# Patient Record
Sex: FEMALE | Race: WHITE
[De-identification: names, ages, dates, MRNs, and addresses within clinical notes are randomized per-mention and may not be internally consistent; named-entity substitution may affect disease eponyms.]

---

## 2018-12-31 ENCOUNTER — HOSPITAL ENCOUNTER (EMERGENCY)
Dept: HOSPITAL 25 - UCEAST | Age: 63
Discharge: HOME | End: 2018-12-31
Payer: COMMERCIAL

## 2018-12-31 VITALS — SYSTOLIC BLOOD PRESSURE: 141 MMHG | DIASTOLIC BLOOD PRESSURE: 94 MMHG

## 2018-12-31 DIAGNOSIS — Z88.5: ICD-10-CM

## 2018-12-31 DIAGNOSIS — Z91.048: ICD-10-CM

## 2018-12-31 DIAGNOSIS — R23.8: Primary | ICD-10-CM

## 2018-12-31 DIAGNOSIS — J45.909: ICD-10-CM

## 2018-12-31 DIAGNOSIS — Z88.8: ICD-10-CM

## 2018-12-31 DIAGNOSIS — Z87.891: ICD-10-CM

## 2018-12-31 DIAGNOSIS — I10: ICD-10-CM

## 2018-12-31 PROCEDURE — 99213 OFFICE O/P EST LOW 20 MIN: CPT

## 2018-12-31 PROCEDURE — G0463 HOSPITAL OUTPT CLINIC VISIT: HCPCS

## 2018-12-31 NOTE — UC
Ear Complaint HPI





- HPI Summary


HPI Summary: 


Pt felt R ear discomfort, occasional pain and she is wondering if there is a 

bug in her ear.  This started 4-5 days ago.








- History of Current Complaint


Chief Complaint: UCEar


Stated Complaint: EAR PAIN


Time Seen by Provider: 12/31/18 13:12


Pregnant?: No


Pain Intensity: 6


Pain Scale Used: 0-10 Numeric


Aggravating Factors: Nothing


Alleviating Factors: Other (Noted In Comments) - hydrogen peroxide.


Associated Signs/Symptoms: Negative: Discharge, Hearing Loss





- Allergies/Home Medications


Allergies/Adverse Reactions: 


 Allergies











Allergy/AdvReac Type Severity Reaction Status Date / Time


 


codeine Allergy  Unknown Verified 12/31/18 13:14





   Reaction  





   Details  


 


propoxyphene [From Darvon] Allergy  Dizziness Verified 12/31/18 13:14


 


horse serum Allergy  Unknown Uncoded 12/31/18 13:14





   Reaction  





   Details  











Home Medications: 


 Home Medications





Fluticasone NASAL SPRAY 50MCG* [Flonase NASAL SPRAY 50MCG*] 1 spray INH ONCE 

PRN 12/31/18 [History Confirmed 12/31/18]


Loratadine [Claritin] 10 mg PO ONCE PRN 12/31/18 [History Confirmed 12/31/18]











PMH/Surg Hx/FS Hx/Imm Hx


Previously Healthy: Yes


Cardiovascular History: Hypertension


Respiratory History: Asthma





- Surgical History


Surgical History: Yes


Surgery Procedure, Year, and Place: hip replacements





- Family History


Known Family History: Positive: Other - Alzheimer's





- Social History


Alcohol Use: Occasionally


Substance Use Type: Prescribed


Smoking Status (MU): Former Smoker


Type: Cigarettes


Amount Used/How Often: 1PCK/DAY


Length of Time of Smoking/Using Tobacco: 10 YEARS


Have You Smoked in the Last Year: No





Review of Systems


All Other Systems Reviewed And Are Negative: Yes


Constitutional: Positive: Negative


Skin: Positive: Negative


Respiratory: Positive: Negative


Cardiovascular: Positive: Negative





Physical Exam


Vital Signs: 


 Initial Vital Signs











Temp  95.9 F   12/31/18 13:09


 


Pulse  90   12/31/18 13:09


 


Resp  18   12/31/18 13:09


 


BP  141/94   12/31/18 13:09


 


Pulse Ox  97   12/31/18 13:09














Ear Complaint Course/Dx





- Course


Course Of Treatment: Started w/ R ear pain and a small pimple/papule was noted 

in R canal.  We also flushed it to clear cerumen debris.  NO TM involvement.





- Differential Dx/Diagnosis


Differential Diagnosis/HQI/PQRI: Otitis Externa, Otitis Media, Trauma, Other


Provider Diagnosis: 


 Papule of skin








Discharge





- Sign-Out/Discharge


Documenting (check all that apply): Patient Departure


All imaging exams completed and their final reports reviewed: No Studies





- Discharge Plan


Condition: Good


Disposition: HOME


Patient Education Materials:  Earache (ED)


Referrals: 


Smita Alanis MD [Primary Care Provider] - 


Additional Instructions: 


The papule in your ear should resolve on its own.  It's best not to put 

anything inside the ear.





- Billing Disposition and Condition


Condition: GOOD


Disposition: Home

## 2018-12-31 NOTE — XMS REPORT
Continuity of Care Document (CCD)

 Created on:2018



Patient:Smita Weber

Sex:Female

:1955

External Reference #:[id number]





Demographics







 Address  [address] [place] [address]

 

 Home Phone  [phone]

 

 Preferred Language  en

 

 Marital Status  Not  or 

 

 Orthodoxy Affiliation  Unknown

 

 Race  White

 

 Ethnic Group  Not  or 









Author







 Name  Ronit Murray PA-C

 

 Address  [address] [place] [address] Road



   Unavailable



   [place] [address]









Care Team Providers







 Name  Role  Phone

 

 Smita Alanis MD  Care Team Information   Unavailable

 

 Smita Alanis MD  Primary Care Physician  Unavailable









Payers







 Type  Date  Identification Numbers  Payment Provider  Subscriber

 

     Policy Number: [id number]  [facility]  Smita Weber









 Group Number: [id number]  [address] [address]

 

 PayID: [id number]  [place] [address]







Advance Directives







 Description

 

 No Information Available







Problems







 Date  Description  Provider  Status

 

 Onset: 2018  Nausea and vomiting  Ronit Murray PA-C  Active

 

 Onset: 2018  Esophageal dysphagia  Ronit Murray PA-C  Active

 

 Onset: 2018  Gastroesophageal reflux disease  Ronit Murray PA-C
  Active







Family History







 Date  Family Member(s)  Problem(s)  Comments

 

   General  Hemochromatosis  







Social History







 Type  Date  Description  Comments

 

 Birth Sex    Unknown  

 

 Tobacco Use  Start: Unknown End: Unknown  Patient is a former smoker  

 

 Smoking Status  Reviewed: 18  Patient is a former smoker  







Allergies, Adverse Reactions, Alerts







 Date  Description  Reaction  Status  Severity  Comments

 

 07/10/2015  Darvon    Active    

 

 07/10/2015  Codeine    Active    







Medications







 Medication  Date  Status  Form  Strength  Qnty  SIG  Indications  Ordering



                 Provider

 

 Rosuvastatin    Active  Tablets  5mg  90tab  Daily    Carie



 Calcium  /2018        s      Iglesia jenkins MD

 

 Tramadol HCL    Active  Tablets  50mg        Howchencho,



                 MD Smita

 

 Pantoprazole    Active  Tablets  40mg        Howchencho,



 Sodium  /0000    DR Smita MD

 

 Hydrocodone-Acetam    Active  Tablets  5-325mg        Annabelle,



 ino              MD Smita

 

 Placquenil    Active    200mg    bid    Unknown



                 

 

 Zolpidem Tartrate    Active  Tablets  10mg    hs    Unknown



   /              

 

 Symbicort    Active  Aerosol  160-4.5mc    2 puff    Unknown



   /      g/Act    twice a day    

 

 Duloxetine HCL    Active  Caps DR  60mg        Unknown



       Part          

 

 Metoprolol    Active  Tablets  50mg    Daily    Unknown



 Tartrate                

 

 Hydrochlorothiazid    Active  Tablets  25mg    Daily    Unknown



 e  /0000              

 

 Lisinopril    Active  Tablets  20mg    1 by mouth    Unknown



   /          every day    

 

 Proventil HFA    Active  Aerosol  108(90Bas    inhale 2    Unknown



   /0000      e)    puffs by    



         mcg/Act    mouth every    



             4 hours as    



             needed for    



             bronchospas    



             m    

 

                 

 

 Peg    Hx  Solution  240gm  4000m  use as    Ronit



 3350/Electrolytes  /2017    Rec    sal Murray



                 , PA-              







Immunizations







 Description

 

 No Information Available







Vital Signs







 Date  Vital  Result  Comment

 

 2018  1:09pm  Height  63 inches  5'3"









 Weight  244.00 lb  

 

 BP Systolic  148 mmHg  

 

 BP Diastolic  83 mmHg  

 

 Heart Rate  96 /min  

 

 BMI (Body Mass Index)  43.2 kg/m2  









 2017  1:18pm  Height  63 inches  5'3"









 Weight  230.00 lb  

 

 BMI (Body Mass Index)  40.7 kg/m2  







Results







 Description

 

 No Information Available







Procedures







 Date  Code  Description  Status

 

 2007  30289  Colonoscopy  Completed







Encounters







 Description

 

 No Information Available







Plan of Treatment

Future Appointment(s):2019  9:00 am - Carie Gonzalez MD at [facility]2018 - SHAYAN Glover-[id number] Gastro-esophageal 
reflux disease without [id number] Dysphagia, pharyngoesophageal 
uqlhgH53.2 Nausea with vomiting, unspecified